# Patient Record
Sex: FEMALE | Race: WHITE | NOT HISPANIC OR LATINO | Employment: UNEMPLOYED | URBAN - METROPOLITAN AREA
[De-identification: names, ages, dates, MRNs, and addresses within clinical notes are randomized per-mention and may not be internally consistent; named-entity substitution may affect disease eponyms.]

---

## 2019-08-07 PROBLEM — M06.9 RHEUMATOID ARTHRITIS: Status: ACTIVE | Noted: 2019-08-07

## 2019-08-07 PROBLEM — Z79.60 LONG-TERM USE OF IMMUNOSUPPRESSANT MEDICATION: Status: ACTIVE | Noted: 2019-08-07

## 2020-01-22 PROBLEM — F32.A DEPRESSION: Status: ACTIVE | Noted: 2020-01-22

## 2020-01-22 PROBLEM — I47.10 SVT (SUPRAVENTRICULAR TACHYCARDIA): Status: ACTIVE | Noted: 2020-01-22

## 2020-04-30 PROBLEM — M85.88 OSTEOPENIA OF SPINE: Status: ACTIVE | Noted: 2020-04-30

## 2020-04-30 PROBLEM — M06.9 RHEUMATOID ARTHRITIS INVOLVING MULTIPLE SITES: Status: ACTIVE | Noted: 2020-04-30

## 2020-04-30 PROBLEM — M85.859 OSTEOPENIA OF HIP: Status: ACTIVE | Noted: 2020-04-30

## 2020-05-19 ENCOUNTER — TELEPHONE (OUTPATIENT)
Dept: PHARMACY | Facility: CLINIC | Age: 60
End: 2020-05-19

## 2020-06-19 ENCOUNTER — TELEPHONE (OUTPATIENT)
Dept: PHARMACY | Facility: CLINIC | Age: 60
End: 2020-06-19

## 2020-06-22 ENCOUNTER — TELEPHONE (OUTPATIENT)
Dept: PHARMACY | Facility: CLINIC | Age: 60
End: 2020-06-22

## 2020-06-22 NOTE — TELEPHONE ENCOUNTER
DOCUMENTATION ONLY:     Appeal for Actemra OVERTURNED, APPROVED from 6/19/20 to 12/19/20.      Case ID# [id number]    Co-Pay: $0.00    Patient Assistance IS NOT required.     Forward to clinical pharmacist for consult & shipment.     WILFRIDO

## 2020-06-22 NOTE — TELEPHONE ENCOUNTER
"Initial Actemra 162mg/0.9ml ACTPen consult completed on . Actemra will be shipped on  to arrive at patient's home on  via [employer]. $0 copay.  Patient plans to start Actemra on . Address confirmed. Confirmed 2 patient identifiers - name and . Therapy Appropriate.     Counseled patient on administration directions:  Inject Actemra 162mg/0.9ml ACTPen (1 pen ) into the skin every 14 days.  - Take out the refrigerator 30-60 minutes prior to injection.   - Wash hands before and after injection.  - Monthly RX will come with gauze, bandaids, and alcohol swabs.  - Patient may inject in either the tops of the thighs, abdomen- but at least 2 inches away from her belly button, or the outer part of her upper arm. Patient was instructed to rotate injections sites.  - Patient is to wipe down the injection site with the alcohol pad, wait to dry. Patient should remove the green cap to the ACTpen when they are ready to inject. Gently squeeze the area of the cleaned skin and hold it firmly.   -Push the ACTpen down firmly against the skin until the needle shield is fully compressed against the pinched skin to unlock the button. Hold the ACTpen firmly in position and activate the injection by dressing the green activation button. A "click" sound will indicate the start of the injection. Keep the green button pressed throughout the injection. The injection could take up to 10 seconds. Hold the ACTPen in place until the purpled indication has stopped moving.   -Lift the ACTPen straight off your skin and release the green button. The needle shield will move out and serena in place.  - Once finished, place the entire ACTPen into the sharps container. Once the container is full, per [place] law, patient may lock the sharps container and place in the trash. Patient can then contact the Pharmacy and we will replace the sharps at no additional charge.     Patient was counseled on possible side effects:  - Injection site reaction: " redness, soreness, itching, bruising, which should resolve within 3-5 days.  - Increased risk for infections. If patient becomes sick, patient is to hold Actemra use until patient is better.     Patient verbalized understanding. Compliance stressed. Patient advised to keep a calendar marking dates of injections to ensure better compliance. Patient advised to call myself or provider should any questions arise. Patient plans to start Actemra on 6/23. Consultation included: indication; goals of treatment; administration; storage and handling; side effects; how to handle side effects; the importance of compliance; how to handle missed doses; the importance of laboratory monitoring; the importance of keeping all follow up appointments. Patient understands to report any medication changes to [facility] and provider. All questions answered and addressed to patients satisfaction. [facility] to contact patient in 3 weeks for refills.

## 2020-07-16 ENCOUNTER — TELEPHONE (OUTPATIENT)
Dept: PHARMACY | Facility: CLINIC | Age: 60
End: 2020-07-16

## 2020-08-11 ENCOUNTER — TELEPHONE (OUTPATIENT)
Dept: PHARMACY | Facility: CLINIC | Age: 60
End: 2020-08-11

## 2020-08-11 NOTE — TELEPHONE ENCOUNTER
RX outgoing call regarding Actemra @ [facility]. Shipping out  for  arrival with patients consent. Copay of $0.00 @ 004. Address and  confirmed. Patient has 1 dose on hand at this time. Patient has not started any new medications, has had no missed doses and no side effects present. Patient is currently taking the medication as directed by doctors instruction. Patient states they do not have any questions or concerns at this time. Patients next injection is scheduled for , . No sharps needed

## 2020-09-10 PROBLEM — N81.6 RECTOCELE: Status: ACTIVE | Noted: 2020-09-10

## 2020-09-10 PROBLEM — N94.10 DYSPAREUNIA IN FEMALE: Status: ACTIVE | Noted: 2020-09-10

## 2020-09-30 PROBLEM — N81.10 CYSTOCELE WITH RECTOCELE: Status: ACTIVE | Noted: 2020-09-30

## 2020-09-30 PROBLEM — N81.6 CYSTOCELE WITH RECTOCELE: Status: ACTIVE | Noted: 2020-09-30

## 2020-10-07 DIAGNOSIS — U07.1 COVID-19 VIRUS DETECTED: ICD-10-CM

## 2020-10-14 ENCOUNTER — TELEPHONE (OUTPATIENT)
Dept: PHARMACY | Facility: CLINIC | Age: 60
End: 2020-10-14

## 2020-10-22 PROBLEM — R32 URINARY INCONTINENCE: Status: ACTIVE | Noted: 2020-10-22

## 2020-10-22 PROBLEM — Z48.89 ENCOUNTER FOR POSTOPERATIVE CARE: Status: ACTIVE | Noted: 2020-10-22

## 2020-11-02 ENCOUNTER — SPECIALTY PHARMACY (OUTPATIENT)
Dept: PHARMACY | Facility: CLINIC | Age: 60
End: 2020-11-02

## 2020-11-04 ENCOUNTER — TELEPHONE (OUTPATIENT)
Dept: PHARMACY | Facility: CLINIC | Age: 60
End: 2020-11-04

## 2020-11-04 NOTE — TELEPHONE ENCOUNTER
Hello,    The prior authorization for Carmel Matute's diflorasone 0.05% cream has been APPROVED for a one-time fill on 11/4/2020. Patient is aware of PA approval. The [facility] will mail the medication to the patient.    Please let me know if you have any questions.    Thanks,  Cori Kong, PharmD  [facility]  [phone]

## 2020-11-04 NOTE — TELEPHONE ENCOUNTER
Submitted Prior Auth for Otezla via [facility]. Key: [id number]. Will follow up once an insurance determination has been made.

## 2020-11-05 NOTE — TELEPHONE ENCOUNTER
Spoke with Dr. Booker with [facility] for a [id number] regarding Otezla therapy. Clarified patient's previous tried and failed therapies. Dr. Booker stated insurance approval is anticipated in the next 24-48 hours. Will continue to follow up.

## 2020-11-06 ENCOUNTER — SPECIALTY PHARMACY (OUTPATIENT)
Dept: PHARMACY | Facility: CLINIC | Age: 60
End: 2020-11-06

## 2020-11-06 NOTE — TELEPHONE ENCOUNTER
Otezla Starter Kit approved. Qty 56 for a 28 d/s. No approval dates listed.   Case ID: [id number]     Submitted Otezla maintenance dose prior Auth via [employer]. Key: [id number]

## 2020-11-10 NOTE — TELEPHONE ENCOUNTER
Called [employer] regarding Otezla starter kit denial. PA is approved. Medicaid's formulary only recognizes ndc: 17039-714-95. Unfortunately that ndc was pulled from the market and replaced by ndc: 28854-086-71.    Medicaid needs to update formulary or allow an override to fill Otezla Starter Kit updated ndc: 01418-349-60. Rep emailed supervisor for an urgent resolution. Reference #: [id number]. Rep stated someone would call [facility] with an update within 24 hours.     Old ndc is no longer  and not available to be purchased by any pharmacy. Told patient. Will follow up if insurance does not call back.

## 2020-11-12 NOTE — TELEPHONE ENCOUNTER
Otezla Starter Kit is still rejecting due to ndc issue. Reached out to [employer] and verified the email is still pending resolution. Rep stated turn around time for the email would be 2 more business days. Will continue to follow up.

## 2020-11-13 NOTE — TELEPHONE ENCOUNTER
"Specialty Pharmacy - Initial Clinical Assessment  Specialty Pharmacy - Medication/Referral Authorization    Specialty Medication Orders Linked to Encounter      Most Recent Value   Medication #1  apremilast (OTEZLA STARTER) 10 mg (4)-20 mg (4)-30 mg (47) DsPk (Order#[id number], Rx#)   Medication #2  apremilast (OTEZLA) 30 mg Tab (Order#[id number], Rx#)        Subjective    Carmel Matute is a 60 y.o. female, who is followed by the specialty pharmacy service for management and education. Patient is currently experiencing small psoriasis bumps on her face around her hairline and across her legs. Patient notes they are flaking and irritating. Actemra has been a "god-send" for her RA symptoms. She notes no joint pain or swelling today. She reports taking proper COVID precautions with Actemra. Verified patient is using aseptic technique, disposing of the pens in sharps container, and rotating injection sites. Reviewed avoiding live vaccines. Patient injects Actemra every other Thursday and denies any side effects on therapy so far. Patient utilizes a calendar to keep track of dosing days and denies any missed/late doses.     Recent Encounters     Date Type Provider Description    11/02/2020 Specialty Pharmacy Gasper Ruffin, Debbie Initial Clinical Assessment; Referral Authorization        Clinical call attempts since last clinical assessment   No call attempts found.     Today she received education before her first fill with [facility].    Current Outpatient Medications   Medication Sig    alendronate (FOSAMAX) 70 MG tablet Take 1 tablet (70 mg total) by mouth every 7 days.    apremilast (OTEZLA STARTER) 10 mg (4)-20 mg (4)-30 mg (47) DsPk Starter pack - use as instructed    apremilast (OTEZLA) 30 mg Tab Take 1 tablet (30 mg total) by mouth 2 (two) times daily.    calcium-vitamin D3 (OS- + D3) 500 mg(1,250mg) -200 unit per tablet Take 1 tablet by mouth once daily.     diflorasone (PSORCON) " 0.05 % cream Apply topically 2 (two) times daily. 2 weeks only    diltiaZEM (CARDIZEM) 30 MG tablet     docusate sodium (COLACE) 100 MG capsule Take 1 capsule (100 mg total) by mouth 2 (two) times daily.    docusate sodium (STOOL SOFTENER) 50 MG capsule Take 1 capsule (50 mg total) by mouth 2 (two) times daily.    DULoxetine (CYMBALTA) 20 MG capsule Take 2 capsules (40 mg total) by mouth once daily.    fluocinonide 0.05% (LIDEX) 0.05 % cream 1 application 2 (two) times daily. Apply to affected area    folic acid (FOLVITE) 1 MG tablet Take 1 tablet (1 mg total) by mouth once daily.    methotrexate 2.5 MG Tab Take 8 tablets (20 mg total) by mouth every 7 days.    multivitamin capsule Take 1 capsule by mouth once daily.     naproxen (NAPROSYN) 500 MG tablet Take 1 tablet (500 mg total) by mouth as needed.    phenazopyridine (PYRIDIUM) 100 MG tablet TK 1 T PO TID WF    tocilizumab (ACTEMRA ACTPEN) 162 mg/0.9 mL PnIj Inject 162 mg into the skin every 14 (fourteen) days.   Last reviewed on 11/6/2020 11:05 AM by Anny Bennett MA    Review of patient's allergies indicates:  No Known AllergiesLast reviewed on  11/6/2020 11:05 AM by Anny Bennett    Drug Interactions    Drug interactions evaluated: yes  Clinically relevant drug interactions identified: no  Provided the patient with educational material regarding drug interactions: not applicable         Adverse Effects    Rash: Pos       Assessment Questions - Documented Responses      Most Recent Value   Assessment   Medication Reconciliation completed for patient  Yes   During the past 4 weeks, has patient missed any activities due to condition or medication?  No   During the past 4 weeks, did patient have any of the following urgent care visits?  None   Goals of Therapy Status  Discussed (new start)   Assesment completed?  Yes   Plan  Therapy being initiated   Do you need to open a clinical intervention (i-vent)?  No   Do you want to schedule first  shipment?  Yes   Medication #1 Assessment Info   Patient status  New medication   Is this medication appropriate for the patient?  Yes   Is this medication effective?  Yes   Medication #2 Assessment Info   Patient status  New medication   Is this medication appropriate for the patient?  Yes   Is this medication effective?  Not yet started        Refill Questions - Documented Responses      Most Recent Value   Relationship to patient of person spoken to?  Self   HIPAA/medical authority confirmed?  Yes   Can the patient store medication/sharps container properly (at the correct temperature, away from children/pets, etc.)?  Yes   Can the patient call emergency services (911) in the event of an emergency?  Yes   Does the patient have any concerns or questions about taking or administering this medication as prescribed?  No   How many doses does the patient have on hand?  0   How many days does the patient report on hand quantity will last?  4   During the past 4 weeks, has patient missed any activities due to condition or medication?  No   During the past 4 weeks, did patient have any of the following urgent care visits?  None   How will the patient receive the medication?  Mail   When does the patient need to receive the medication?  11/17/20   Shipping Address  Home   Address in [facility] confirmed and updated if neccessary?  Yes   Expected Copay ($)  0   Is the patient able to afford the medication copay?  Yes   Payment Method  zero copay   Days supply of Refill  0   Refill activity completed?  Yes   Refill activity plan  Refill scheduled   Shipment/Pickup Date:  11/16/20          Objective    She has a past medical history of Arthritis, Depression, and Dysplasia of cervix, low grade (MIGUEL 1).    Tried/failed medications: Humira (skin rash) and Arava (side effects)    BP Readings from Last 4 Encounters:   11/06/20 133/72   10/28/20 (!) 117/54   10/27/20 134/84   10/23/20 109/69     Ht Readings from Last 4  "Encounters:   11/06/20 5' 5" (1.651 m)   10/28/20 5' 5" (1.651 m)   10/27/20 5' 5" (1.651 m)   10/22/20 5' 5" (1.651 m)     Wt Readings from Last 4 Encounters:   11/06/20 100 kg (220 lb 6.4 oz)   10/28/20 99.3 kg (219 lb)   10/27/20 99.8 kg (220 lb)   10/22/20 98.9 kg (218 lb)     Recent Labs   Lab Result Units 10/28/20  1111 10/23/20  0403 10/20/20  0919 10/06/20  1136   Creatinine mg/dL 0.73 0.5 0.6 0.5   ALT U/L 25  --   --   --    AST U/L 14  --   --   --      The goals of rheumatoid arthritis treatment include:  · Relieving pain and suppressing inflammation  · Achieving remission and preventing joint and organ damage  · Increasing joint mobility and strength  · Preventing infection and other complications of treatment  · Reducing long term complications of rheumatoid arthritis  · Improving or maintaining physical function and optimal well-being  · Improving or maintaining quality of life  · Maintaining optimal therapy adherence  · Minimizing and managing side effects    Goals of Therapy Status: Discussed (new start)    Assessment/Plan  Patient plans to start Otezla on 11/17/20     Current Regimen is Methotrexate 20 mg weekly, folic acid daily, Actemra every 14 days, and adding on Otezla 30 mg      Indication, dosage, appropriateness, effectiveness, safety and convenience of her specialty medication(s) were reviewed today.     Patient Counseling    Counseled the patient on the following: doses and administration discussed, safe handling, storage, and disposal discussed, possible adverse effects and management discussed, possible drug and prescription drug interactions discussed, possible drug and OTC drug and food interactions discussed, lab monitoring and follow-up discussed, use of contraception discussed, therapeutic rationale discussed, cost of medications and cost implications discussed, adherence and missed doses discussed, pharmacy contact information discussed       Initial Otezla consult was completed " on 20. Otezla will be shipped on  to arrive at patient's home on  via [employer]. $0 copay. Patient will start Otezla on . Address confirmed. Confirmed 2 patient identifiers - name and . Therapy Appropriate. Starter kit is not being covered by insurance due to insurance ndc issues. Patient will start Otezla 30 mg once daily for about a week then increase to 30 mg twice daily if once daily dose is tolerable.      Patient was given Otezla counseling as follows:  Take by mouth:    - Take with or without food.  - Swallow the tablet whole. Do not crush, split, or chew the tablet.  - Try not to miss any scheduled doses. If you forget, take your dose as soon as you remember, then take the next dose at the regularly scheduled time. Call if you have any questions. Do not take 2 tablets at the same time.     Patient was counseled on the following possible side effects, which include, but are not limited to:  headache, diarrhea, upset stomach, weight loss. Contact provider if allergic reaction, major weight loss, or depression should occur.     DDIs:  Medication list reviewed and no potential DDIs found during initial review. Patient no longer taking ibuprofen, norco, kenolog oint, Prednisone, or Bactrim.     Patient confirmed understanding. Patient did not have additional questions.   Patient will start Otezla on  . Consultation included: indication; goals of treatment; administration; storage and handling; side effects; how to handle side effects; the importance of compliance; how to handle missed doses; the importance of laboratory monitoring; the importance of keeping all follow up appointments. Patient understands to report any medication changes to [facility] and provider.  All questions answered and addressed to patients satisfaction. [facility] to contact patient in 3 weeks for refills.     LFTs WNLs, CrCl 169 ml/min, CBC stable on 10/28/2020. TB negative on 2020. No contraindications to therapy and  patient has had positive outcomes. Therapy appropriate to continue. Answered patient's questions and concerns. [facility] to call for refills. Will reassess therapy in 90 days.     Tasks added this encounter   12/8/2020 - Refill Call (Auto Added)  2/4/2021 - Clinical - Follow Up Assesement (90 day)   Tasks due within next 3 months   No tasks due.     Gasper Ruffin, Debbie  [facility]  [address] [place] [id number]  Phone: [phone]  Fax: [phone]

## 2020-12-10 ENCOUNTER — SPECIALTY PHARMACY (OUTPATIENT)
Dept: PHARMACY | Facility: CLINIC | Age: 60
End: 2020-12-10

## 2020-12-10 DIAGNOSIS — M05.79 RHEUMATOID ARTHRITIS INVOLVING MULTIPLE SITES WITH POSITIVE RHEUMATOID FACTOR: ICD-10-CM

## 2020-12-10 NOTE — TELEPHONE ENCOUNTER
Specialty Pharmacy - Refill Coordination    Specialty Medication Orders Linked to Encounter      Most Recent Value   Medication #1  tocilizumab (ACTEMRA ACTPEN) 162 mg/0.9 mL PnIj (Order#[id number], Rx#[id number])   Medication #2  apremilast (OTEZLA) 30 mg Tab (Order#[id number], Rx#[id number])          Refill Questions - Documented Responses      Most Recent Value   Relationship to patient of person spoken to?  Self   HIPAA/medical authority confirmed?  Yes   Any changes in contact preferences or allowed representatives?  No   Has the patient had any insurance changes?  No   Has the patient had any changes to specialty medication, dose, or instructions?  No   Has the patient started taking any new medications, herbals, or supplements?  No   Has the patient been diagnosed with any new medical conditions?  No   Does the patient have any new allergies to medications or foods?  No   Does the patient have any concerns about side effects?  No   Can the patient store medication/sharps container properly (at the correct temperature, away from children/pets, etc.)?  Yes   Can the patient call emergency services (911) in the event of an emergency?  Yes   Does the patient have any concerns or questions about taking or administering this medication as prescribed?  No   How many doses did the patient miss in the past 4 weeks or since the last fill?  0   How many doses does the patient have on hand?  6   How many days does the patient report on hand quantity will last?  6   Does the number of doses/days supply remaining match pharmacy expected amounts?  Yes   Does the patient feel that this medication is effective?  Yes   During the past 4 weeks, has patient missed any activities due to condition or medication?  No   During the past 4 weeks, did patient have any of the following urgent care visits?  None   How will the patient receive the medication?  Mail   When does the patient need to receive the medication?  12/16/20    Shipping Address  Home   Address in [facility] confirmed and updated if neccessary?  Yes   Expected Copay ($)  6   Is the patient able to afford the medication copay?  Yes   Payment Method  (!) invoice (approval required)   Days supply of Refill  28   Would patient like to speak to a pharmacist?  No   Do you want to trigger an intervention?  No   Do you want to trigger an additional referral task?  No   Refill activity completed?  Yes   Refill activity plan  Refill scheduled   Shipment/Pickup Date:  12/14/20          Current Outpatient Medications   Medication Sig    alendronate (FOSAMAX) 70 MG tablet Take 1 tablet (70 mg total) by mouth every 7 days.    apremilast (OTEZLA STARTER) 10 mg (4)-20 mg (4)-30 mg (47) DsPk Starter pack - use as instructed    apremilast (OTEZLA) 30 mg Tab Take 1 tablet (30 mg total) by mouth 2 (two) times daily.    calcium-vitamin D3 (OS- + D3) 500 mg(1,250mg) -200 unit per tablet Take 1 tablet by mouth once daily.     conjugated estrogens (PREMARIN) vaginal cream Place 0.5 g vaginally twice a week.    diflorasone (PSORCON) 0.05 % cream Apply topically 2 (two) times daily. 2 weeks only    diltiaZEM (CARDIZEM) 30 MG tablet     docusate sodium (COLACE) 100 MG capsule Take 1 capsule (100 mg total) by mouth 2 (two) times daily.    docusate sodium (STOOL SOFTENER) 50 MG capsule Take 1 capsule (50 mg total) by mouth 2 (two) times daily.    DULoxetine (CYMBALTA) 20 MG capsule Take 2 capsules (40 mg total) by mouth once daily.    fluocinonide 0.05% (LIDEX) 0.05 % cream 1 application 2 (two) times daily. Apply to affected area    folic acid (FOLVITE) 1 MG tablet Take 1 tablet (1 mg total) by mouth once daily.    methotrexate 2.5 MG Tab Take 8 tablets (20 mg total) by mouth every 7 days.    multivitamin capsule Take 1 capsule by mouth once daily.     naproxen (NAPROSYN) 500 MG tablet Take 1 tablet (500 mg total) by mouth as needed.    phenazopyridine (PYRIDIUM) 100 MG  tablet TK 1 T PO TID WF    tocilizumab (ACTEMRA ACTPEN) 162 mg/0.9 mL PnIj Inject 162 mg into the skin every 14 (fourteen) days.   Last reviewed on 12/4/2020 11:05 AM by Gabriela Simental LPN    Review of patient's allergies indicates:  No Known Allergies Last reviewed on  12/4/2020 11:05 AM by Gabriela Simental      Tasks added this encounter   No tasks added.   Tasks due within next 3 months   12/8/2020 - Refill Call (Auto Added)  2/4/2021 - Clinical - Follow Up Assesement (90 day)     Yeyo Munguia  [facility]  [address] [place] [id number]  Phone: [phone]  Fax: [phone]

## 2021-01-05 ENCOUNTER — PATIENT MESSAGE (OUTPATIENT)
Dept: PHARMACY | Facility: CLINIC | Age: 61
End: 2021-01-05

## 2021-01-05 ENCOUNTER — SPECIALTY PHARMACY (OUTPATIENT)
Dept: PHARMACY | Facility: CLINIC | Age: 61
End: 2021-01-05

## 2021-01-05 DIAGNOSIS — M05.79 RHEUMATOID ARTHRITIS INVOLVING MULTIPLE SITES WITH POSITIVE RHEUMATOID FACTOR: Primary | ICD-10-CM

## 2021-02-01 ENCOUNTER — SPECIALTY PHARMACY (OUTPATIENT)
Dept: PHARMACY | Facility: CLINIC | Age: 61
End: 2021-02-01

## 2021-03-09 ENCOUNTER — SPECIALTY PHARMACY (OUTPATIENT)
Dept: PHARMACY | Facility: CLINIC | Age: 61
End: 2021-03-09

## 2021-03-25 PROBLEM — L40.9 PSORIASIS: Status: ACTIVE | Noted: 2021-03-25

## 2021-04-12 ENCOUNTER — PATIENT MESSAGE (OUTPATIENT)
Dept: PHARMACY | Facility: CLINIC | Age: 61
End: 2021-04-12

## 2021-04-12 ENCOUNTER — SPECIALTY PHARMACY (OUTPATIENT)
Dept: PHARMACY | Facility: CLINIC | Age: 61
End: 2021-04-12

## 2021-04-19 ENCOUNTER — SPECIALTY PHARMACY (OUTPATIENT)
Dept: PHARMACY | Facility: CLINIC | Age: 61
End: 2021-04-19

## 2021-05-12 ENCOUNTER — PATIENT MESSAGE (OUTPATIENT)
Dept: RESEARCH | Facility: HOSPITAL | Age: 61
End: 2021-05-12

## 2021-05-17 ENCOUNTER — PATIENT MESSAGE (OUTPATIENT)
Dept: PHARMACY | Facility: CLINIC | Age: 61
End: 2021-05-17

## 2021-05-17 ENCOUNTER — SPECIALTY PHARMACY (OUTPATIENT)
Dept: PHARMACY | Facility: CLINIC | Age: 61
End: 2021-05-17

## 2021-06-17 ENCOUNTER — PATIENT MESSAGE (OUTPATIENT)
Dept: PHARMACY | Facility: CLINIC | Age: 61
End: 2021-06-17

## 2021-06-22 ENCOUNTER — SPECIALTY PHARMACY (OUTPATIENT)
Dept: PHARMACY | Facility: CLINIC | Age: 61
End: 2021-06-22

## 2021-08-12 ENCOUNTER — SPECIALTY PHARMACY (OUTPATIENT)
Dept: PHARMACY | Facility: CLINIC | Age: 61
End: 2021-08-12

## 2021-08-16 ENCOUNTER — SPECIALTY PHARMACY (OUTPATIENT)
Dept: PHARMACY | Facility: CLINIC | Age: 61
End: 2021-08-16

## 2021-09-14 ENCOUNTER — PATIENT MESSAGE (OUTPATIENT)
Dept: PHARMACY | Facility: CLINIC | Age: 61
End: 2021-09-14

## 2021-09-22 ENCOUNTER — SPECIALTY PHARMACY (OUTPATIENT)
Dept: PHARMACY | Facility: CLINIC | Age: 61
End: 2021-09-22

## 2021-10-18 ENCOUNTER — PATIENT MESSAGE (OUTPATIENT)
Dept: PHARMACY | Facility: CLINIC | Age: 61
End: 2021-10-18

## 2021-10-26 ENCOUNTER — SPECIALTY PHARMACY (OUTPATIENT)
Dept: PHARMACY | Facility: CLINIC | Age: 61
End: 2021-10-26

## 2021-11-22 ENCOUNTER — SPECIALTY PHARMACY (OUTPATIENT)
Dept: PHARMACY | Facility: CLINIC | Age: 61
End: 2021-11-22

## 2021-12-20 ENCOUNTER — SPECIALTY PHARMACY (OUTPATIENT)
Dept: PHARMACY | Facility: CLINIC | Age: 61
End: 2021-12-20

## 2022-01-15 ENCOUNTER — SPECIALTY PHARMACY (OUTPATIENT)
Dept: PHARMACY | Facility: CLINIC | Age: 62
End: 2022-01-15

## 2022-01-15 NOTE — TELEPHONE ENCOUNTER
Called patient for Xeljanz refill. Patient stated she just received Xeljanz 2 weeks ago and she still has a lot on hand. Per last refill date, we sent it out 12/21. Patient was not home to check medication bottle/ last shipment paperwork. Requested a call back on 1/18 to follow up.

## 2022-01-15 NOTE — TELEPHONE ENCOUNTER
Specialty Pharmacy - Refill Coordination    Specialty Medication Orders Linked to Encounter    Flowsheet Row Most Recent Value   Medication #1 tofacitinib (XELJANZ) 5 mg Tab (Order#[id number], Rx#[id number])          Refill Questions - Documented Responses    Flowsheet Row Most Recent Value   Patient Availability and HIPAA Verification    Does patient want to proceed with activity? Yes   HIPAA/medical authority confirmed? Yes   Relationship to patient of person spoken to? Self   Refill Screening Questions    Changes to allergies? No   Changes to medications? No   New conditions since last clinic visit? No   Unplanned office visit, urgent care, ED, or hospital admission in the last 4 weeks? No   How does patient/caregiver feel medication is working? Good   Financial problems or insurance changes? No   How many doses of your specialty medications were missed in the last 4 weeks? 0   Would patient like to speak to a pharmacist? No   When does the patient need to receive the medication? 01/21/22   Refill Delivery Questions    How will the patient receive the medication? Mail   When does the patient need to receive the medication? 01/21/22   Shipping Address Home   Address in [facility] confirmed and updated if neccessary? Yes   Expected Copay ($) 0   Is the patient able to afford the medication copay? Yes   Payment Method zero copay   Days supply of Refill 30   Supplies needed? No supplies needed   Refill activity completed? Yes   Refill activity plan Refill scheduled   Shipment/Pickup Date: 01/19/22          Current Outpatient Medications   Medication Sig    alendronate (FOSAMAX) 70 MG tablet Take 1 tablet (70 mg total) by mouth every 7 days.    conjugated estrogens (PREMARIN) vaginal cream Place 0.5 g vaginally twice a week.    docusate sodium (STOOL SOFTENER) 50 MG capsule Take 1 capsule (50 mg total) by mouth 2 (two) times daily.    DULoxetine (CYMBALTA) 20 MG capsule TAKE 2 CAPSULES BY MOUTH ONCE DAILY     folic acid (FOLVITE) 1 MG tablet Take 1 tablet (1,000 mcg total) by mouth once daily.    halobetasol (ULTRAVATE) 0.05 % cream Apply topically once daily.    meloxicam (MOBIC) 7.5 MG tablet Take 7.5 mg by mouth 2 (two) times daily.    methotrexate 2.5 MG Tab Take 8 tablets (20 mg total) by mouth every 7 days.    multivitamin capsule Take 1 capsule by mouth once daily.     mupirocin calcium 2% (BACTROBAN) 2 % cream Apply to affected area 3 times daily    naproxen (NAPROSYN) 500 MG tablet TAKE 1 TABLET (500 MG TOTAL) BY MOUTH AS NEEDED.    phenazopyridine (PYRIDIUM) 100 MG tablet TK 1 T PO TID WF    phentermine (ADIPEX-P) 37.5 mg tablet Take 37.5 mg by mouth once daily.    tofacitinib (XELJANZ) 5 mg Tab Take 1 tablet (5 mg) by mouth 2 (two) times daily.   Last reviewed on 11/10/2021  9:59 AM by Linda Padgett MD    Review of patient's allergies indicates:  No Known Allergies Last reviewed on  11/10/2021 9:59 AM by Linda Padgett      Tasks added this encounter   2/11/2022 - Refill Call (Auto Added)   Tasks due within next 3 months   No tasks due.     Dione Padua Esguerra  [facility] - Specialty Pharmacy  [address] [facility] [address]  Phone: [phone]  Fax: [phone]

## 2022-02-11 ENCOUNTER — SPECIALTY PHARMACY (OUTPATIENT)
Dept: PHARMACY | Facility: CLINIC | Age: 62
End: 2022-02-11

## 2022-02-11 NOTE — TELEPHONE ENCOUNTER
Specialty Pharmacy - Refill Coordination    Specialty Medication Orders Linked to Encounter    Flowsheet Row Most Recent Value   Medication #1 tofacitinib (XELJANZ) 5 mg Tab (Order#[id number], Rx#[id number])          Refill Questions - Documented Responses    Flowsheet Row Most Recent Value   Patient Availability and HIPAA Verification    Does patient want to proceed with activity? Yes   HIPAA/medical authority confirmed? Yes   Relationship to patient of person spoken to? Self   Refill Screening Questions    Changes to allergies? No   Changes to medications? No   New conditions since last clinic visit? No   Unplanned office visit, urgent care, ED, or hospital admission in the last 4 weeks? No   How does patient/caregiver feel medication is working? Good   Financial problems or insurance changes? No   How many doses of your specialty medications were missed in the last 4 weeks? 0   Would patient like to speak to a pharmacist? No   When does the patient need to receive the medication? 02/15/22   Refill Delivery Questions    How will the patient receive the medication? Delivery Nurys   When does the patient need to receive the medication? 02/15/22   Shipping Address Home   Address in [facility] confirmed and updated if neccessary? Yes   Expected Copay ($) 0   Is the patient able to afford the medication copay? Yes   Payment Method zero copay   Days supply of Refill 30   Supplies needed? No supplies needed   Refill activity completed? Yes   Refill activity plan Refill scheduled   Shipment/Pickup Date: 02/14/22          Current Outpatient Medications   Medication Sig    alendronate (FOSAMAX) 70 MG tablet Take 1 tablet (70 mg total) by mouth every 7 days.    conjugated estrogens (PREMARIN) vaginal cream Place 0.5 g vaginally twice a week.    docusate sodium (STOOL SOFTENER) 50 MG capsule Take 1 capsule (50 mg total) by mouth 2 (two) times daily.    DULoxetine (CYMBALTA) 20 MG capsule TAKE 2 CAPSULES BY MOUTH  ONCE DAILY    folic acid (FOLVITE) 1 MG tablet Take 1 tablet (1,000 mcg total) by mouth once daily.    halobetasol (ULTRAVATE) 0.05 % cream Apply topically once daily.    meloxicam (MOBIC) 7.5 MG tablet Take 7.5 mg by mouth 2 (two) times daily.    methotrexate 2.5 MG Tab Take 8 tablets (20 mg total) by mouth every 7 days.    multivitamin capsule Take 1 capsule by mouth once daily.     mupirocin calcium 2% (BACTROBAN) 2 % cream Apply to affected area 3 times daily    naproxen (NAPROSYN) 500 MG tablet TAKE 1 TABLET (500 MG TOTAL) BY MOUTH AS NEEDED.    phenazopyridine (PYRIDIUM) 100 MG tablet TK 1 T PO TID WF    phentermine (ADIPEX-P) 37.5 mg tablet Take 37.5 mg by mouth once daily.    tofacitinib (XELJANZ) 5 mg Tab Take 1 tablet (5 mg) by mouth 2 (two) times daily.   Last reviewed on 11/10/2021  9:59 AM by Linda Padgett MD    Review of patient's allergies indicates:  No Known Allergies Last reviewed on  11/10/2021 9:59 AM by Linda Padgett      Tasks added this encounter   No tasks added.   Tasks due within next 3 months   2/11/2022 - Refill Call (Auto Added)     [facility]  [address] [place] [id number]  Phone: [phone]  Fax: [phone]

## 2022-03-10 ENCOUNTER — PATIENT MESSAGE (OUTPATIENT)
Dept: PHARMACY | Facility: CLINIC | Age: 62
End: 2022-03-10

## 2022-03-10 ENCOUNTER — SPECIALTY PHARMACY (OUTPATIENT)
Dept: PHARMACY | Facility: CLINIC | Age: 62
End: 2022-03-10

## 2022-03-10 DIAGNOSIS — M05.79 RHEUMATOID ARTHRITIS INVOLVING MULTIPLE SITES WITH POSITIVE RHEUMATOID FACTOR: Primary | ICD-10-CM

## 2022-03-17 NOTE — TELEPHONE ENCOUNTER
Incoming call from the patient regarding the Xeljanz refill. PA required. Urgent PA request submitted via CMM. Key: [id number]

## 2022-03-18 NOTE — TELEPHONE ENCOUNTER
[id number] approved through 3/17/2023. Rheum OV and labs reviewed, Xeljanz for RA refill appropriate.

## 2022-03-21 NOTE — TELEPHONE ENCOUNTER
Specialty Pharmacy - Refill Coordination  Specialty Pharmacy - Medication/Referral Authorization    Specialty Medication Orders Linked to Encounter    Flowsheet Row Most Recent Value   Medication #1 tofacitinib (XELJANZ) 5 mg Tab (Order#[id number], Rx#[id number])          Refill Questions - Documented Responses    Flowsheet Row Most Recent Value   Patient Availability and HIPAA Verification    Does patient want to proceed with activity? Yes   HIPAA/medical authority confirmed? Yes   Relationship to patient of person spoken to? Self   Refill Screening Questions    Changes to allergies? No   Changes to medications? No   New conditions since last clinic visit? No   Unplanned office visit, urgent care, ED, or hospital admission in the last 4 weeks? No   How does patient/caregiver feel medication is working? Good   Financial problems or insurance changes? No   How many doses of your specialty medications were missed in the last 4 weeks? 0   Would patient like to speak to a pharmacist? No   When does the patient need to receive the medication? 03/23/22   Refill Delivery Questions    How will the patient receive the medication? Mail   When does the patient need to receive the medication? 03/23/22   Shipping Address Home   Address in [facility] confirmed and updated if neccessary? Yes   Expected Copay ($) 0   Is the patient able to afford the medication copay? Yes   Payment Method zero copay   Days supply of Refill 30   Supplies needed? No supplies needed   Refill activity completed? Yes   Refill activity plan Refill scheduled   Shipment/Pickup Date: 03/21/22          Current Outpatient Medications   Medication Sig    alendronate (FOSAMAX) 70 MG tablet Take 1 tablet (70 mg total) by mouth every 7 days.    conjugated estrogens (PREMARIN) vaginal cream Place 0.5 g vaginally twice a week.    diltiaZEM (CARDIZEM) 30 MG tablet Take by mouth.    docusate sodium (STOOL SOFTENER) 50 MG capsule Take 1 capsule (50 mg  total) by mouth 2 (two) times daily.    DULoxetine (CYMBALTA) 20 MG capsule TAKE 2 CAPSULES BY MOUTH ONCE DAILY    folic acid (FOLVITE) 1 MG tablet Take 1 tablet (1,000 mcg total) by mouth once daily.    halobetasol (ULTRAVATE) 0.05 % cream Apply topically once daily.    LORazepam (ATIVAN) 0.5 MG tablet Take 0.5 mg by mouth daily as needed.    meloxicam (MOBIC) 7.5 MG tablet Take 7.5 mg by mouth 2 (two) times daily.    methotrexate 2.5 MG Tab Take 8 tablets (20 mg total) by mouth every 7 days.    multivitamin capsule Take 1 capsule by mouth once daily.     mupirocin calcium 2% (BACTROBAN) 2 % cream Apply to affected area 3 times daily    naproxen (NAPROSYN) 500 MG tablet TAKE 1 TABLET (500 MG TOTAL) BY MOUTH AS NEEDED.    ofloxacin (OCUFLOX) 0.3 % ophthalmic solution Place 1 drop into the right eye 4 (four) times daily. for 7 days    phenazopyridine (PYRIDIUM) 100 MG tablet TK 1 T PO TID WF    phentermine (ADIPEX-P) 37.5 mg tablet Take 37.5 mg by mouth once daily.    tofacitinib (XELJANZ) 5 mg Tab Take 1 tablet (5 mg) by mouth 2 (two) times daily.   Last reviewed on 3/16/2022 12:01 PM by Linda Padgett MD    Review of patient's allergies indicates:  No Known Allergies Last reviewed on  3/16/2022 12:01 PM by Linda Padgett      Tasks added this encounter   4/15/2022 - Refill Call (Auto Added)   Tasks due within next 3 months   No tasks due.     Maria Aguirre, BrookeD  [facility] - Specialty Pharmacy  [address] [place] [address]  Phone: [phone]  Fax: [phone]

## 2022-04-14 ENCOUNTER — SPECIALTY PHARMACY (OUTPATIENT)
Dept: PHARMACY | Facility: CLINIC | Age: 62
End: 2022-04-14

## 2022-04-18 ENCOUNTER — PATIENT MESSAGE (OUTPATIENT)
Dept: ADMINISTRATIVE | Facility: OTHER | Age: 62
End: 2022-04-18

## 2022-04-20 ENCOUNTER — PATIENT MESSAGE (OUTPATIENT)
Dept: PHARMACY | Facility: CLINIC | Age: 62
End: 2022-04-20

## 2022-04-22 NOTE — TELEPHONE ENCOUNTER
Specialty Pharmacy - Refill Coordination    Specialty Medication Orders Linked to Encounter    Flowsheet Row Most Recent Value   Medication #1 tofacitinib (XELJANZ) 5 mg Tab (Order#[id number], Rx#[id number])          Refill Questions - Documented Responses    Flowsheet Row Most Recent Value   Patient Availability and HIPAA Verification    Does patient want to proceed with activity? Yes   HIPAA/medical authority confirmed? Yes   Relationship to patient of person spoken to? Self   Refill Screening Questions    Changes to allergies? No   Changes to medications? No   New conditions since last clinic visit? No   Unplanned office visit, urgent care, ED, or hospital admission in the last 4 weeks? No   How does patient/caregiver feel medication is working? Very good   Financial problems or insurance changes? No   How many doses of your specialty medications were missed in the last 4 weeks? 0   Would patient like to speak to a pharmacist? No   When does the patient need to receive the medication? 04/25/22   Refill Delivery Questions    How will the patient receive the medication? Mail   When does the patient need to receive the medication? 04/25/22   Shipping Address Home   Address in [facility] confirmed and updated if neccessary? Yes   Expected Copay ($) 0   Is the patient able to afford the medication copay? Yes   Payment Method zero copay   Days supply of Refill 30   Supplies needed? No supplies needed   Refill activity completed? Yes   Refill activity plan Refill scheduled   Shipment/Pickup Date: 04/25/22          Current Outpatient Medications   Medication Sig    alendronate (FOSAMAX) 70 MG tablet Take 1 tablet (70 mg total) by mouth every 7 days.    conjugated estrogens (PREMARIN) vaginal cream Place 0.5 g vaginally twice a week.    diltiaZEM (CARDIZEM) 30 MG tablet Take by mouth.    docusate sodium (STOOL SOFTENER) 50 MG capsule Take 1 capsule (50 mg total) by mouth 2 (two) times daily.    DULoxetine  (CYMBALTA) 20 MG capsule TAKE 2 CAPSULES BY MOUTH ONCE DAILY    folic acid (FOLVITE) 1 MG tablet Take 1 tablet (1,000 mcg total) by mouth once daily.    halobetasol (ULTRAVATE) 0.05 % cream Apply topically once daily.    LORazepam (ATIVAN) 0.5 MG tablet Take 0.5 mg by mouth daily as needed.    meloxicam (MOBIC) 7.5 MG tablet Take 7.5 mg by mouth 2 (two) times daily.    methotrexate 2.5 MG Tab Take 8 tablets (20 mg total) by mouth every 7 days.    multivitamin capsule Take 1 capsule by mouth once daily.     mupirocin calcium 2% (BACTROBAN) 2 % cream Apply to affected area 3 times daily    naproxen (NAPROSYN) 500 MG tablet TAKE 1 TABLET (500 MG TOTAL) BY MOUTH AS NEEDED.    phenazopyridine (PYRIDIUM) 100 MG tablet TK 1 T PO TID WF    phentermine (ADIPEX-P) 37.5 mg tablet Take 37.5 mg by mouth once daily.    tofacitinib (XELJANZ) 5 mg Tab Take 1 tablet (5 mg) by mouth 2 (two) times daily.   Last reviewed on 3/16/2022 12:01 PM by Linda Padgett MD    Review of patient's allergies indicates:  No Known Allergies Last reviewed on  3/16/2022 12:01 PM by Linda Padgett      Tasks added this encounter   5/18/2022 - Refill Call (Auto Added)   Tasks due within next 3 months   No tasks due.     Nerissa Li, PharmD  [facility]  [address] [place] [address]  Phone: [phone]  Fax: [phone]

## 2022-05-18 ENCOUNTER — PATIENT MESSAGE (OUTPATIENT)
Dept: PHARMACY | Facility: CLINIC | Age: 62
End: 2022-05-18

## 2022-05-24 ENCOUNTER — PATIENT MESSAGE (OUTPATIENT)
Dept: PHARMACY | Facility: CLINIC | Age: 62
End: 2022-05-24

## 2022-05-30 ENCOUNTER — SPECIALTY PHARMACY (OUTPATIENT)
Dept: PHARMACY | Facility: CLINIC | Age: 62
End: 2022-05-30

## 2022-05-30 DIAGNOSIS — M05.79 RHEUMATOID ARTHRITIS INVOLVING MULTIPLE SITES WITH POSITIVE RHEUMATOID FACTOR: Primary | ICD-10-CM

## 2022-06-02 NOTE — TELEPHONE ENCOUNTER
Specialty Pharmacy - Refill Coordination    Specialty Medication Orders Linked to Encounter    Flowsheet Row Most Recent Value   Medication #1 tofacitinib (XELJANZ) 5 mg Tab (Order#[id number], Rx#[id number])        Refill Questions - Documented Responses    Flowsheet Row Most Recent Value   Patient Availability and HIPAA Verification    Does patient want to proceed with activity? Unable to Reach        We have had multiple attempts to the patient and have been unsuccessful to reach the patient. We will stop reaching out to the patient but in the event that the patient needs the med and contacts us, we will communicate and begin dispensing for the patient. At your next visit with the patient, please review the importance of being in contact with our specialty pharmacy as a part of our care team.    Interventions added this encounter   Closed: [facility] Provider Intervention - Drug therapy adherence: tofacitinib (XELJANZ) 5 mg Tab     Maria Aguirre, PharmD  [facility]  [address] [facility] [address]  Phone: [phone]  Fax: [phone]

## 2023-02-13 PROBLEM — B96.29 UTI DUE TO EXTENDED-SPECTRUM BETA LACTAMASE (ESBL) PRODUCING ESCHERICHIA COLI: Status: ACTIVE | Noted: 2023-02-13

## 2023-02-13 PROBLEM — Z16.24 MULTIPLE DRUG RESISTANT ORGANISM (MDRO) CULTURE POSITIVE: Status: ACTIVE | Noted: 2023-02-13

## 2023-02-13 PROBLEM — N93.9 VAGINAL SPOTTING: Status: ACTIVE | Noted: 2023-02-13

## 2023-02-13 PROBLEM — Z16.12 UTI DUE TO EXTENDED-SPECTRUM BETA LACTAMASE (ESBL) PRODUCING ESCHERICHIA COLI: Status: ACTIVE | Noted: 2023-02-13

## 2023-02-13 PROBLEM — N39.0 UTI DUE TO EXTENDED-SPECTRUM BETA LACTAMASE (ESBL) PRODUCING ESCHERICHIA COLI: Status: ACTIVE | Noted: 2023-02-13

## 2023-02-13 PROBLEM — N39.0 UTI (URINARY TRACT INFECTION): Status: ACTIVE | Noted: 2023-02-13

## 2023-03-22 ENCOUNTER — SPECIALTY PHARMACY (OUTPATIENT)
Dept: PHARMACY | Facility: CLINIC | Age: 63
End: 2023-03-22

## 2023-03-22 NOTE — TELEPHONE ENCOUNTER
Ruy, this is Keren Braun with [facility].  We are working on your prescription that your doctor has sent us. We will be working with your insurance to get this approved for you. We will be calling you along the way with updates on your medication.  If you have any questions, you can reach us at [phone].    Welcome call outcome: Patient/caregiver reached  -PA required. Labs in process.

## 2023-03-24 NOTE — TELEPHONE ENCOUNTER
Incoming call from patient for status check on Xeljanz. Patient stated labs should be updated in chart and she is currently out of medication. Will alert assigned Rph.

## 2023-03-27 NOTE — TELEPHONE ENCOUNTER
Incoming call from patient to check status of Xeljanz. Informed pt we are awaiting her TB test result as it is still processing and need to see a confirmation of a negative test. Pt verbalized understanding and request [facility] to call once resulted to setup refill. No further questions.

## 2023-03-28 NOTE — TELEPHONE ENCOUNTER
Outgoing call to patient to inform that Tb results are taking longer than expecting to return and that I will attempt to reach out to the lab.

## 2023-03-28 NOTE — TELEPHONE ENCOUNTER
Outgoing call to [facility] regarding Tspot test. Provided patient's MRN and confirmed that specimen has been taken and ran for this patient. This is a send out lab and the average turnaround time 7 to 10 days. Outgoing call to patient to inform, LVM.

## 2023-04-03 NOTE — TELEPHONE ENCOUNTER
Incoming call from pt wanting a status check on Xeljanz. Informed her that her TB test is still in process. Pt said she never received a TB test and will call her doctor.

## 2023-04-04 ENCOUNTER — SPECIALTY PHARMACY (OUTPATIENT)
Dept: PHARMACY | Facility: CLINIC | Age: 63
End: 2023-04-04

## 2023-04-04 NOTE — TELEPHONE ENCOUNTER
Specialty Pharmacy - Initial Clinical Assessment    Specialty Medication Orders Linked to Encounter      Flowsheet Row Most Recent Value   Medication #1 tofacitinib (XELJANZ) 5 mg Tab (Order#[id number], Rx#[id number])          Patient Diagnosis   M06.9 - Rheumatoid arthritis involving multiple sites    Subjective    Carmel Matute is a 62 y.o. female, who is followed by the specialty pharmacy service for management and education.    Recent Encounters       Date Type Provider Description    04/04/2023 Specialty Pharmacy Keren Braun PharmD Initial Clinical Assessment    03/22/2023 Specialty Pharmacy Keren Braun PharmD Referral Authorization    05/30/2022 Specialty Pharmacy Maria Aguirre PharmD Refill Coordination    04/14/2022 Specialty Pharmacy Kristi Henriquez, Patient Care Assistant Refill Coordination    03/10/2022 Specialty Pharmacy Berlin Barber Refill Coordination; Referral Authorization          Clinical call attempts since last clinical assessment   No call attempts found.     Current Outpatient Medications   Medication Sig    alendronate (FOSAMAX) 70 MG tablet Take 1 tablet (70 mg total) by mouth every 7 days.    conjugated estrogens (PREMARIN) vaginal cream Place 0.5 g vaginally every evening for 14 days, THEN 0.5 g twice a week.    docusate sodium (STOOL SOFTENER) 50 MG capsule Take 1 capsule (50 mg total) by mouth 2 (two) times daily as needed.    folic acid (FOLVITE) 1 MG tablet Take 1 tablet (1,000 mcg total) by mouth once daily.    methotrexate 2.5 MG Tab Take 8 tablets (20 mg total) by mouth every 7 days.    multivitamin capsule Take 1 capsule by mouth once daily.     naproxen (NAPROSYN) 500 MG tablet TAKE 1 TABLET (500 MG TOTAL) BY MOUTH AS NEEDED.    sodium chloride 0.9 % PgBk 100 mL with ertapenem 1 gram SolR 1 g Inject 1 g into the vein once daily.    tofacitinib (XELJANZ) 5 mg Tab Take 1 tablet (5 mg) by mouth 2 (two) times daily.   Last reviewed on 4/4/2023  1:45 PM by Keren Braun  PharmD    Review of patient's allergies indicates:  No Known AllergiesLast reviewed on  4/4/2023 1:56 PM by Keren Braun    Drug Interactions    Drug interactions evaluated: yes  Clinically relevant drug interactions identified: no  Provided the patient with educational material regarding drug interactions: not applicable         Adverse Effects    Arthralgias: Pos  Gait problem: Pos  Joint swelling: Pos  *All other systems reviewed and are negative       Assessment Questions - Documented Responses      Flowsheet Row Most Recent Value   Assessment    Medication Reconciliation completed for patient Yes   During the past 4 weeks, has patient missed any activities due to condition or medication? No   During the past 4 weeks, did patient have any of the following urgent care visits? None   Goals of Therapy Status Discussed (new start)   Status of the patients ability to self-administer: Is Able   All education points have been covered with patient? No, patient declined- printed education provided   Welcome packet contents reviewed and discussed with patient? Yes   Assesment completed? Yes   Plan Therapy being initiated   Do you need to open a clinical intervention (i-vent)? No   Do you want to schedule first shipment? Yes          Refill Questions - Documented Responses      Flowsheet Row Most Recent Value   Patient Availability and HIPAA Verification    Does patient want to proceed with activity? Yes   HIPAA/medical authority confirmed? Yes   Relationship to patient of person spoken to? Self   Refill Screening Questions    When does the patient need to receive the medication? 04/06/23   Refill Delivery Questions    How will the patient receive the medication? Mail   When does the patient need to receive the medication? 04/06/23   Shipping Address Home   Address in [facility] confirmed and updated if neccessary? Yes   Expected Copay ($) 0   Is the patient able to afford the medication copay? Yes   Payment  "Method zero copay   Days supply of Refill 30   Supplies needed? No supplies needed   Refill activity completed? Yes   Refill activity plan Refill scheduled   Shipment/Pickup Date: 04/05/23            Objective    She has a past medical history of Arthritis, Depression, and Dysplasia of cervix, low grade (MIGUEL 1).    Tried/failed medications: NSAIDs, Arava, Humira, Inflectra, Actemra, Otezla    BP Readings from Last 4 Encounters:   03/22/23 108/60   03/13/23 118/79   02/15/23 108/71   02/13/23 134/66     Ht Readings from Last 4 Encounters:   03/22/23 5' 5" (1.651 m)   03/13/23 5' 5" (1.651 m)   02/14/23 5' 5" (1.651 m)   02/13/23 5' 5" (1.651 m)     Wt Readings from Last 4 Encounters:   03/22/23 81 kg (178 lb 9.6 oz)   03/13/23 80.7 kg (178 lb)   02/14/23 81.6 kg (180 lb)   02/13/23 83.9 kg (185 lb)     Recent Labs   Lab Result Units 03/22/23  1159 02/15/23  0433 02/14/23  1442 02/13/23  1956   Creatinine mg/dL 0.6 0.6 0.6 0.6   ALT U/L 6 L <5 L 7 L 6 L   AST U/L 14 9 L 12 10   Hepatitis B Surface Ag  Non-reactive  --   --   --      The goals of rheumatoid arthritis treatment include:  Relieving pain and suppressing inflammation  Achieving remission and preventing joint and organ damage  Increasing joint mobility and strength  Preventing infection and other complications of treatment  Reducing long term complications of rheumatoid arthritis  Improving or maintaining physical function and optimal well-being  Improving or maintaining quality of life  Maintaining optimal therapy adherence  Minimizing and managing side effects    Goals of Therapy Status: Discussed (new start)/ Re-initiation    Assessment/Plan  Patient plans to start therapy on 04/06/23      Indication, dosage, appropriateness, effectiveness, safety and convenience of her specialty medication(s) were reviewed today.     Patient Education   Pharmacist offer to  patient was declined. Printed educational materials will be provided with medication.  " Patient did accept verbal education on the following topics:  · Proper use, timely administration, and missed dose management  · New or changed medications, including prescribe and over the counter medications and supplements  · Reviews recommended vaccinations, as appropriate      Tasks added this encounter   4/29/2023 - Refill Call (Auto Added)  1/4/2024 - Clinical - Follow Up Assesement (Annual)   Tasks due within next 3 months   No tasks due.     Keren Braun, PharmD  [facility]  [address]  [facility] [address]  Phone: [phone]  Fax: [phone]

## 2023-04-04 NOTE — TELEPHONE ENCOUNTER
Ailyn NAVARRETE approved 03/24/23 through 09/24/23.  Tracking ID: [id number]  Approval letter scanned into media

## 2023-04-11 PROBLEM — N95.0 POSTMENOPAUSAL BLEEDING: Status: ACTIVE | Noted: 2023-04-11

## 2023-04-11 PROBLEM — N84.0 ENDOMETRIAL POLYP: Status: ACTIVE | Noted: 2023-04-11

## 2023-04-11 PROBLEM — N39.0 RECURRENT UTI: Status: ACTIVE | Noted: 2023-04-11

## 2023-05-01 ENCOUNTER — SPECIALTY PHARMACY (OUTPATIENT)
Dept: PHARMACY | Facility: CLINIC | Age: 63
End: 2023-05-01

## 2023-05-01 NOTE — TELEPHONE ENCOUNTER
Specialty Pharmacy - Refill Coordination    Specialty Medication Orders Linked to Encounter      Flowsheet Row Most Recent Value   Medication #1 tofacitinib (XELJANZ) 5 mg Tab (Order#[id number], Rx#[id number])            Refill Questions - Documented Responses      Flowsheet Row Most Recent Value   Patient Availability and HIPAA Verification    Does patient want to proceed with activity? Yes   HIPAA/medical authority confirmed? Yes   Relationship to patient of person spoken to? Self   Refill Screening Questions    Changes to allergies? No   Changes to medications? No   New conditions since last clinic visit? No   Unplanned office visit, urgent care, ED, or hospital admission in the last 4 weeks? No   How does patient/caregiver feel medication is working? Good   Financial problems or insurance changes? No   How many doses of your specialty medications were missed in the last 4 weeks? 0   Would patient like to speak to a pharmacist? No   When does the patient need to receive the medication? 05/08/23   Refill Delivery Questions    How will the patient receive the medication? Mail   When does the patient need to receive the medication? 05/08/23   Shipping Address Home   Address in [facility] confirmed and updated if neccessary? Yes   Expected Copay ($) 0   Is the patient able to afford the medication copay? Yes   Payment Method zero copay   Days supply of Refill 30   Supplies needed? No supplies needed   Refill activity completed? Yes   Refill activity plan Refill scheduled   Shipment/Pickup Date: 05/03/23            Current Outpatient Medications   Medication Sig    alendronate (FOSAMAX) 70 MG tablet Take 1 tablet (70 mg total) by mouth every 7 days.    conjugated estrogens (PREMARIN) vaginal cream Place 0.5 g vaginally every evening for 14 days, THEN 0.5 g twice a week.    docusate sodium (STOOL SOFTENER) 50 MG capsule Take 1 capsule (50 mg total) by mouth 2 (two) times daily as needed.    folic acid (FOLVITE)  1 MG tablet Take 1 tablet (1,000 mcg total) by mouth once daily.    methotrexate 2.5 MG Tab Take 8 tablets (20 mg total) by mouth every 7 days.    multivitamin capsule Take 1 capsule by mouth once daily.     naproxen (NAPROSYN) 500 MG tablet TAKE 1 TABLET (500 MG TOTAL) BY MOUTH AS NEEDED.    sodium chloride 0.9 % PgBk 100 mL with ertapenem 1 gram SolR 1 g Inject 1 g into the vein once daily.    tofacitinib (XELJANZ) 5 mg Tab Take 1 tablet (5 mg) by mouth 2 (two) times daily.   Last reviewed on 4/11/2023  5:41 PM by Ely Ventura NP    Review of patient's allergies indicates:  No Known Allergies Last reviewed on  4/11/2023 5:41 PM by Ely Ventura      Tasks added this encounter   No tasks added.   Tasks due within next 3 months   4/29/2023 - Refill Coordination Outreach (1 time occurrence)     [facility]  [address] [place], [place] [address]  [facility] [address]  Phone: [phone]  Fax: [phone]

## 2023-05-15 PROBLEM — N39.0 UTI (URINARY TRACT INFECTION): Status: RESOLVED | Noted: 2023-02-13 | Resolved: 2023-05-15

## 2023-05-22 PROBLEM — N39.0 UTI DUE TO EXTENDED-SPECTRUM BETA LACTAMASE (ESBL) PRODUCING ESCHERICHIA COLI: Status: RESOLVED | Noted: 2023-02-13 | Resolved: 2023-05-22

## 2023-05-22 PROBLEM — B96.29 UTI DUE TO EXTENDED-SPECTRUM BETA LACTAMASE (ESBL) PRODUCING ESCHERICHIA COLI: Status: RESOLVED | Noted: 2023-02-13 | Resolved: 2023-05-22

## 2023-05-22 PROBLEM — Z16.12 UTI DUE TO EXTENDED-SPECTRUM BETA LACTAMASE (ESBL) PRODUCING ESCHERICHIA COLI: Status: RESOLVED | Noted: 2023-02-13 | Resolved: 2023-05-22

## 2023-05-24 PROBLEM — N93.8 DUB (DYSFUNCTIONAL UTERINE BLEEDING): Status: ACTIVE | Noted: 2023-05-24

## 2023-05-25 PROBLEM — Z98.890 STATUS POST HYSTEROSCOPY: Status: ACTIVE | Noted: 2023-05-25

## 2023-05-26 ENCOUNTER — SPECIALTY PHARMACY (OUTPATIENT)
Dept: PHARMACY | Facility: CLINIC | Age: 63
End: 2023-05-26

## 2023-05-26 NOTE — TELEPHONE ENCOUNTER
Outgoing call regarding xeljanz refill; per pt, she has about a week on hand; informed her that it's too soon until 5/27, and [facility] will follow up on 5/29 to schedule delivery

## 2023-05-29 NOTE — TELEPHONE ENCOUNTER
Specialty Pharmacy - Refill Coordination    Specialty Medication Orders Linked to Encounter      Flowsheet Row Most Recent Value   Medication #1 tofacitinib (XELJANZ) 5 mg Tab (Order#[id number], Rx#[id number])            Refill Questions - Documented Responses      Flowsheet Row Most Recent Value   Patient Availability and HIPAA Verification    Does patient want to proceed with activity? Yes   HIPAA/medical authority confirmed? Yes   Relationship to patient of person spoken to? Self   Refill Screening Questions    Changes to allergies? No   Changes to medications? No   New conditions since last clinic visit? No   Unplanned office visit, urgent care, ED, or hospital admission in the last 4 weeks? No   How does patient/caregiver feel medication is working? Very good   Financial problems or insurance changes? No   How many doses of your specialty medications were missed in the last 4 weeks? 0   Would patient like to speak to a pharmacist? No   When does the patient need to receive the medication? 05/31/23   Refill Delivery Questions    How will the patient receive the medication? Mail   When does the patient need to receive the medication? 05/31/23   Shipping Address Home   Address in [facility] confirmed and updated if neccessary? Yes   Expected Copay ($) 3   Is the patient able to afford the medication copay? Yes   Payment Method invoice (approval required)   Days supply of Refill 30   Supplies needed? No supplies needed   Refill activity completed? Yes   Refill activity plan Refill scheduled   Shipment/Pickup Date: 05/30/23            Current Outpatient Medications   Medication Sig    alendronate (FOSAMAX) 70 MG tablet Take 1 tablet (70 mg total) by mouth every 7 days.    conjugated estrogens (PREMARIN) vaginal cream Place 0.5 g vaginally every evening for 14 days, THEN 0.5 g twice a week. (Patient not taking: Reported on 5/9/2023)    docusate sodium (COLACE) 100 MG capsule Take 1 capsule (100 mg total) by  mouth 2 (two) times daily.    folic acid (FOLVITE) 1 MG tablet Take 1 tablet (1,000 mcg total) by mouth once daily.    HYDROcodone-acetaminophen (NORCO) 5-325 mg per tablet Take 1 tablet by mouth every 6 (six) hours as needed for Pain.    ibuprofen (ADVIL,MOTRIN) 800 MG tablet Take 1 tablet (800 mg total) by mouth every 8 (eight) hours as needed for Pain.    methotrexate 2.5 MG Tab Take 8 tablets (20 mg total) by mouth every 7 days.    MOUNJARO 2.5 mg/0.5 mL PnIj SMARTSI SUB-Q Once a Week    multivitamin capsule Take 1 capsule by mouth once daily.     naproxen (NAPROSYN) 500 MG tablet TAKE 1 TABLET (500 MG TOTAL) BY MOUTH AS NEEDED.    tofacitinib (XELJANZ) 5 mg Tab Take 1 tablet (5 mg) by mouth 2 (two) times daily. (Patient not taking: Reported on 2023)   Last reviewed on 2023  6:47 AM by Rhonda Hickman CRNA    Review of patient's allergies indicates:  No Known Allergies Last reviewed on  2023 8:36 AM by Jovanny Fonseca      Tasks added this encounter   No tasks added.   Tasks due within next 3 months   2023 - Refill Coordination Outreach (1 time occurrence)     [facility]  [address]  [facility] [address]  Phone: [phone]  Fax: [phone]

## 2023-06-08 PROBLEM — N85.01 SIMPLE ENDOMETRIAL HYPERPLASIA WITHOUT ATYPIA: Status: ACTIVE | Noted: 2023-06-08

## 2023-06-23 ENCOUNTER — SPECIALTY PHARMACY (OUTPATIENT)
Dept: PHARMACY | Facility: CLINIC | Age: 63
End: 2023-06-23

## 2023-06-23 NOTE — TELEPHONE ENCOUNTER
Outgoing call: Patient has about 1 week left on hand, informed the pt that a refill request was sent to her MD and once approved [facility] will follow up.

## 2023-06-26 NOTE — TELEPHONE ENCOUNTER
Specialty Pharmacy - Refill Coordination    Specialty Medication Orders Linked to Encounter      Flowsheet Row Most Recent Value   Medication #1 tofacitinib (XELJANZ) 5 mg Tab (Order#[id number], Rx#)            Refill Questions - Documented Responses      Flowsheet Row Most Recent Value   Patient Availability and HIPAA Verification    Does patient want to proceed with activity? Yes   HIPAA/medical authority confirmed? Yes   Relationship to patient of person spoken to? Self   Refill Screening Questions    Changes to allergies? No   Changes to medications? No   New conditions since last clinic visit? No   Unplanned office visit, urgent care, ED, or hospital admission in the last 4 weeks? No   How does patient/caregiver feel medication is working? Good   Financial problems or insurance changes? No   How many doses of your specialty medications were missed in the last 4 weeks? 0   Would patient like to speak to a pharmacist? No   When does the patient need to receive the medication? 07/03/23   Refill Delivery Questions    How will the patient receive the medication? Mail   When does the patient need to receive the medication? 07/03/23   Shipping Address Home   Address in [facility] confirmed and updated if neccessary? Yes   Expected Copay ($) 3   Is the patient able to afford the medication copay? Yes   Payment Method invoice (approval required)  [pt will send in check]   Days supply of Refill 30   Supplies needed? No supplies needed   Refill activity completed? Yes   Refill activity plan Refill scheduled   Shipment/Pickup Date: 06/29/23            Current Outpatient Medications   Medication Sig    alendronate (FOSAMAX) 70 MG tablet Take 1 tablet (70 mg total) by mouth every 7 days.    conjugated estrogens (PREMARIN) vaginal cream Place 0.5 g vaginally every evening for 14 days, THEN 0.5 g twice a week.    docusate sodium (COLACE) 100 MG capsule Take 1 capsule (100 mg total) by mouth 2 (two) times daily.    folic  acid (FOLVITE) 1 MG tablet Take 1 tablet (1,000 mcg total) by mouth once daily.    HYDROcodone-acetaminophen (NORCO) 5-325 mg per tablet Take 1 tablet by mouth every 6 (six) hours as needed for Pain.    ibuprofen (ADVIL,MOTRIN) 800 MG tablet Take 1 tablet (800 mg total) by mouth every 8 (eight) hours as needed for Pain.    methotrexate 2.5 MG Tab Take 8 tablets (20 mg total) by mouth every 7 days.    MOUNJARO 2.5 mg/0.5 mL PnIj SMARTSI SUB-Q Once a Week    multivitamin capsule Take 1 capsule by mouth once daily.     naproxen (NAPROSYN) 500 MG tablet TAKE 1 TABLET (500 MG TOTAL) BY MOUTH AS NEEDED.    tofacitinib (XELJANZ) 5 mg Tab Take 5 mg by mouth 2 (two) times a day.    tofacitinib (XELJANZ) 5 mg Tab Take one tablet by mouth daily   Last reviewed on 2023  9:19 AM by Krupa Briceño MA    Review of patient's allergies indicates:  No Known Allergies Last reviewed on  2023 9:19 AM by Krupa Briceño      Tasks added this encounter   No tasks added.   Tasks due within next 3 months   2023 - Refill Coordination Outreach (1 time occurrence)     Kaylyn Card, PharmD  [facility]  [address] [place], [place] [address]  [facility] [address]  Phone: [phone]  Fax: [phone]

## 2023-07-06 PROBLEM — Z90.710 STATUS POST LAPAROSCOPIC ASSISTED VAGINAL HYSTERECTOMY (LAVH): Status: ACTIVE | Noted: 2023-07-06

## 2023-07-07 PROBLEM — A59.01 TRICHOMONAL VAGINITIS: Status: ACTIVE | Noted: 2023-07-07

## 2023-07-17 PROBLEM — N39.0 RECURRENT UTI: Status: RESOLVED | Noted: 2023-04-11 | Resolved: 2023-07-17

## 2023-07-24 ENCOUNTER — SPECIALTY PHARMACY (OUTPATIENT)
Dept: PHARMACY | Facility: CLINIC | Age: 63
End: 2023-07-24

## 2023-07-24 NOTE — TELEPHONE ENCOUNTER
Specialty Pharmacy - Refill Coordination    Specialty Medication Orders Linked to Encounter      Flowsheet Row Most Recent Value   Medication #1 tofacitinib (XELJANZ) 5 mg Tab (Order#[id number], Rx#[id number])            Refill Questions - Documented Responses      Flowsheet Row Most Recent Value   Patient Availability and HIPAA Verification    Does patient want to proceed with activity? Yes   HIPAA/medical authority confirmed? Yes   Relationship to patient of person spoken to? Self   Refill Screening Questions    Changes to allergies? No   Changes to medications? No   New conditions since last clinic visit? No   Unplanned office visit, urgent care, ED, or hospital admission in the last 4 weeks? No   How does patient/caregiver feel medication is working? Good   Financial problems or insurance changes? No   How many doses of your specialty medications were missed in the last 4 weeks? 5   Why were doses missed? Other (comment)  [held per MD instructions due to planned surgery]   Would patient like to speak to a pharmacist? No   When does the patient need to receive the medication? 07/31/23   Refill Delivery Questions    When does the patient need to receive the medication? 07/31/23   Shipping Address Home   Address in [facility] confirmed and updated if neccessary? Yes   Expected Copay ($) 3   Is the patient able to afford the medication copay? Yes   Payment Method invoice (approval required)  [[facility] approved, patient to send in check]   Days supply of Refill 30   Supplies needed? No supplies needed   Refill activity completed? Yes   Refill activity plan Refill scheduled   Shipment/Pickup Date: 07/27/23            Current Outpatient Medications   Medication Sig    alendronate (FOSAMAX) 70 MG tablet Take 1 tablet (70 mg total) by mouth every 7 days.    conjugated estrogens (PREMARIN) vaginal cream Place 0.5 g vaginally every evening for 14 days, THEN 0.5 g twice a week.    docusate sodium (COLACE) 100 MG capsule  Take 1 capsule (100 mg total) by mouth 2 (two) times daily.    folic acid (FOLVITE) 1 MG tablet Take 1 tablet (1,000 mcg total) by mouth once daily.    HYDROcodone-acetaminophen (NORCO)  mg per tablet Take 1 tablet by mouth every 6 (six) hours as needed for Pain (severe pain).    ibuprofen (ADVIL,MOTRIN) 800 MG tablet Take 1 tablet (800 mg total) by mouth every 8 (eight) hours as needed for Pain.    methotrexate 2.5 MG Tab Take 8 tablets (20 mg total) by mouth every 7 days.    MOUNJARO 2.5 mg/0.5 mL PnIj SMARTSI SUB-Q Once a Week    multivitamin capsule Take 1 capsule by mouth once daily.     naproxen (NAPROSYN) 500 MG tablet TAKE 1 TABLET (500 MG TOTAL) BY MOUTH AS NEEDED.    tofacitinib (XELJANZ) 5 mg Tab Take one tablet by mouth twice daily    tofacitinib (XELJANZ) 5 mg Tab Take 5 mg by mouth 2 (two) times a day.   Last reviewed on 2023  9:30 AM by Glory Whitaker RN    Review of patient's allergies indicates:  No Known Allergies Last reviewed on  2023 2:12 PM by Mikayla Salinas      Tasks added this encounter   No tasks added.   Tasks due within next 3 months   No tasks due.     Keren Braun, PharmD  [facility]  [address] [place] [address]  Phone: [phone]  Fax: [phone]

## 2023-07-24 NOTE — TELEPHONE ENCOUNTER
Outgoing call regarding xeljanz refill; per pt, she has about 4 days on hand; pt reported having a hysterectomy almost 3 weeks ago; transferred to [facility]

## 2024-05-26 ENCOUNTER — PATIENT MESSAGE (OUTPATIENT)
Dept: ADMINISTRATIVE | Facility: OTHER | Age: 64
End: 2024-05-26